# Patient Record
Sex: FEMALE | Race: BLACK OR AFRICAN AMERICAN | Employment: UNEMPLOYED
[De-identification: names, ages, dates, MRNs, and addresses within clinical notes are randomized per-mention and may not be internally consistent; named-entity substitution may affect disease eponyms.]

---

## 2019-02-15 ENCOUNTER — HEALTH MAINTENANCE LETTER (OUTPATIENT)
Age: 24
End: 2019-02-15

## 2019-06-30 ENCOUNTER — HOSPITAL ENCOUNTER (EMERGENCY)
Facility: CLINIC | Age: 24
Discharge: HOME | End: 2019-06-30
Attending: EMERGENCY MEDICINE | Admitting: EMERGENCY MEDICINE

## 2019-06-30 VITALS
HEART RATE: 116 BPM | RESPIRATION RATE: 16 BRPM | DIASTOLIC BLOOD PRESSURE: 75 MMHG | SYSTOLIC BLOOD PRESSURE: 131 MMHG | WEIGHT: 150 LBS | OXYGEN SATURATION: 100 %

## 2019-06-30 DIAGNOSIS — G24.02 DYSTONIC DRUG REACTION: ICD-10-CM

## 2019-06-30 LAB
ANION GAP SERPL CALCULATED.3IONS-SCNC: 9 MMOL/L (ref 3–14)
BUN SERPL-MCNC: 12 MG/DL (ref 7–30)
CA-I SERPL ISE-MCNC: 4.6 MG/DL (ref 4.4–5.2)
CALCIUM SERPL-MCNC: 8.9 MG/DL (ref 8.5–10.1)
CHLORIDE SERPL-SCNC: 105 MMOL/L (ref 94–109)
CO2 SERPL-SCNC: 23 MMOL/L (ref 20–32)
CREAT SERPL-MCNC: 0.62 MG/DL (ref 0.52–1.04)
GFR SERPL CREATININE-BSD FRML MDRD: >90 ML/MIN/{1.73_M2}
GLUCOSE SERPL-MCNC: 143 MG/DL (ref 70–99)
POTASSIUM SERPL-SCNC: 3.6 MMOL/L (ref 3.4–5.3)
SODIUM SERPL-SCNC: 137 MMOL/L (ref 133–144)

## 2019-06-30 PROCEDURE — 80048 BASIC METABOLIC PNL TOTAL CA: CPT | Performed by: EMERGENCY MEDICINE

## 2019-06-30 PROCEDURE — 96374 THER/PROPH/DIAG INJ IV PUSH: CPT

## 2019-06-30 PROCEDURE — 25000128 H RX IP 250 OP 636: Performed by: EMERGENCY MEDICINE

## 2019-06-30 PROCEDURE — 99284 EMERGENCY DEPT VISIT MOD MDM: CPT | Mod: 25

## 2019-06-30 PROCEDURE — 82330 ASSAY OF CALCIUM: CPT | Performed by: EMERGENCY MEDICINE

## 2019-06-30 RX ORDER — BENZTROPINE MESYLATE 2 MG/1
2 TABLET ORAL 2 TIMES DAILY PRN
Qty: 8 TABLET | Refills: 0 | Status: SHIPPED | OUTPATIENT
Start: 2019-06-30

## 2019-06-30 RX ORDER — DIPHENHYDRAMINE HYDROCHLORIDE 50 MG/ML
25 INJECTION INTRAMUSCULAR; INTRAVENOUS ONCE
Status: COMPLETED | OUTPATIENT
Start: 2019-06-30 | End: 2019-06-30

## 2019-06-30 RX ORDER — DIPHENHYDRAMINE HCL 25 MG
25-50 TABLET ORAL EVERY 6 HOURS PRN
Qty: 20 TABLET | Refills: 0 | Status: SHIPPED | OUTPATIENT
Start: 2019-06-30

## 2019-06-30 RX ADMIN — DIPHENHYDRAMINE HYDROCHLORIDE 25 MG: 50 INJECTION, SOLUTION INTRAMUSCULAR; INTRAVENOUS at 22:10

## 2019-06-30 ASSESSMENT — ENCOUNTER SYMPTOMS
SHORTNESS OF BREATH: 0
TROUBLE SWALLOWING: 1

## 2019-06-30 NOTE — ED AVS SNAPSHOT
[facility] Emergency Department  [address]  [facility] [address]  Phone:  [phone]  Fax:  [phone]                                    Niki Cruz   MRN: [id number]    Department:  [facility] Emergency Department   Date of Visit:  6/30/2019           After Visit Summary Signature Page    I have received my discharge instructions, and my questions have been answered. I have discussed any challenges I see with this plan with the nurse or doctor.    ..........................................................................................................................................  Patient/Patient Representative Signature      ..........................................................................................................................................  Patient Representative Print Name and Relationship to Patient    ..................................................               ................................................  Date                                   Time    ..........................................................................................................................................  Reviewed by Signature/Title    ...................................................              ..............................................  Date                                               Time          22EPIC Rev 08/18

## 2019-06-30 NOTE — LETTER
July 2, 2019      To Whom It May Concern:      Niki Cruz was seen in our Emergency Department on 06/30/2019. Her condition is expected to improve over 1-2 days, she may return to work when improved.    Sincerely,        Isi Nieto RN

## 2019-07-01 NOTE — DISCHARGE INSTRUCTIONS
Please return to the emergency department as needed for new or worsening symptoms including recurrence of initial symptoms without resolution using the prescribed medications, seizures, any other concerning symptoms.

## 2019-07-01 NOTE — ED TRIAGE NOTES
Pt reports that she went to [facility] yesterday for suicidal thoughts.  She was there about 24 hrs and released.  Pt denies needing mental health evaluation today.

## 2019-07-01 NOTE — ED PROVIDER NOTES
"  History     Chief Complaint:  Allergic Reaction    HPI   Niki Cruz is a 24 year old female, with diagnosis of anxiety and depression, who presents with her roommate for evaluation of a possible allergic reaction. She states that she was at [facility] earlier today as she was suicidal at that time and received a \"shot of something\". She states that for the last two hours, she has had spasms in her right leg and in her tongue where she is unable to keep her tongue in her mouth. Due to this, patient presented.     Here in the ED, she states no known trigger and endorses some difficulty swallowing, though denies any shortness of breath or ingestion of any over the counter medications. She denies any suicidal ideation here.     Allergies:  Ciprofloxacin     Medications:    Ibuprofen  Naproxen  Nuvaring  Prozac  Hydroxyzine    Past Medical History:    Anxiety   Depressive disorder  Suicidal ideation    Past Surgical History:    History reviewed. No pertinent past surgical history.     Family History:    Father - mental illness  Mother - mental illness, thyroid disease  Sister - mental illness    Social History:  The patient was accompanied to the ED by roommate.  Smoking Status: Yes - occasional  Smokeless Tobacco: N/A  Alcohol Use: Yes - occasional   Drug Use: No      Review of Systems   HENT: Positive for trouble swallowing.         \"tongue spasms\"   Respiratory: Negative for shortness of breath.    Musculoskeletal:        \"Right leg spasm\"   Psychiatric/Behavioral: Negative for suicidal ideas.   All other systems reviewed and are negative.      Physical Exam   Vitals:  Patient Vitals for the past 24 hrs:   BP Pulse Resp SpO2 Weight   06/30/19 2200 131/75 116 16 100 % 68 kg (150 lb)      Physical Exam  Constitutional: Well developed, nontox appearance  Head: Atraumatic.   Mouth/Throat: Oropharynx is clear and moist. Sticking tongue out of mouth, difficulty speaking  Neck:  no stridor  Eyes: no scleral " icterus  Cardiovascular: RRR, 2+ bilat radial pulses  Pulmonary/Chest: nml resp effort, Clear BS bilat  Abdominal: ND, +BS, soft, NT, no rebound or guarding   Ext: Warm, well perfused, no edema  Neurological: A&O, symmetric facies, moves ext x4, 5/5 strength throughout upper and lower extremities, symmetric; moving left lower extremiity  Skin: Skin is warm and dry.   Psychiatric: Anxious, behavior is normal. Thought content normal.   Nursing note and vitals reviewed.      Emergency Department Course   Laboratory:  Laboratory findings were communicated with the patient and roommate who voiced understanding of the findings.  Calcium ionized: 4.6  BMP: Glucose 143 (H) o/w WNL (Creatinine 0.62)     Interventions:   Benadryl 25 mg IV     Emergency Department Course:  Nursing notes and vitals reviewed.  IV was inserted and blood was drawn for laboratory testing, results above.     ()   I performed an exam of the patient as documented above. History obtained from patient.    ()   Updated that patient feels improved after Benadryl.    ()   Rechecked patient. She reports improvement. Discussed plan of care and patient is comfortable with discharge.     I discussed the treatment plan with the patient. They expressed understanding of this plan and consented to discharge. They will be discharged home with instructions for care and follow up. In addition, the patient will return to the emergency department if their symptoms persist, worsen, if new symptoms arise or if there is any concern.  All questions were answered prior to discharge.    Impression & Plan      Medical Decision Makin y.o. M presenting w/ abnml movements    Unable to find out medication that was given earlier this morning.  Patient reports she is no longer suicidal.  Patient's presentation consistent with dystonic reaction.  She is given Benadryl with resolution of her symptoms therefore Cogentin not given.  Doubt seizure, electrolyte  abnormality, intracranial hemorrhage or CVA.  Given resolution of symptoms, at this time I feel patient is safe for discharge.  Prescriptions written as noted below for management of symptoms should they recur and instructions given regarding use.  Patient counseled on results, diagnosis and disposition prior to discharge.  She is understanding and agreeable to plan.  Patient was subsequently discharged in stable condition.    Diagnosis:    ICD-10-CM    1. Dystonic drug reaction G24.02         Disposition:   Discharged.    Discharge Medications:     Medication List      Started    benztropine 2 MG tablet  Commonly known as:  COGENTIN  2 mg, Oral, 2 TIMES DAILY PRN     diphenhydrAMINE 25 MG tablet  Commonly known as:  BENADRYL  25-50 mg, Oral, EVERY 6 HOURS PRN            Scribe Disclosure:  I, Shelly Lee, am serving as a scribe at 10:03 PM on 6/30/2019 to document services personally performed by Mane Robertson MD, based on my observations and the provider's statements to me.  6/30/2019   [facility] EMERGENCY DEPARTMENT       Mane Robertson MD  07/01/19 0070

## 2019-07-01 NOTE — ED TRIAGE NOTES
Pt says that she has had tounge swelling and muscle spasms for the past 2 hours.  Says she has a cipro allergy but she has not taken any.  Pt presents with her tounge hanging out and she says it is spasming.  Denies difficulty breathing.

## 2019-08-26 ENCOUNTER — NURSE TRIAGE (OUTPATIENT)
Dept: NURSING | Facility: CLINIC | Age: 24
End: 2019-08-26

## 2019-08-26 NOTE — TELEPHONE ENCOUNTER
"\"For the last two weeks I've been constipated\".  Caller is reporting rectal pain, she has passed watery stool and also had an accident yesterday.  She sees blood when she wipes.    Advised to go to ER.    Reason for Disposition    [1] Rectal pain or fullness from fecal impaction (rectum full of stool) AND [2] NOT better after SITZ bath, suppository or enema    Additional Information    Negative: [1] Abdomen pain is main symptom AND [2] adult male    Negative: [1] Abdomen pain is main symptom AND [2] adult female    Negative: Rectal bleeding or blood in stool is main symptom    Negative: Rectal pain or itching is main symptom    Negative: Constipation in a cancer patient who is currently (or recently) receiving chemotherapy or radiation therapy, or cancer patient who has metastatic or end-stage cancer and is receiving palliative care    Negative: Patient sounds very sick or weak to the triager    Negative: [1] Vomiting AND [2] abdomen looks much more swollen than usual    Negative: [1] Vomiting AND [2] contains bile (green color)    Negative: [1] Constant abdominal pain AND [2] present > 2 hours    Protocols used: CONSTIPATION-A-    Helen Gorman, ALEJANDRO  [facility]      "

## 2019-08-27 ENCOUNTER — HOSPITAL ENCOUNTER (EMERGENCY)
Facility: CLINIC | Age: 24
Discharge: HOME | End: 2019-08-27
Attending: EMERGENCY MEDICINE | Admitting: EMERGENCY MEDICINE
Payer: COMMERCIAL

## 2019-08-27 VITALS
TEMPERATURE: 98.5 F | WEIGHT: 149.91 LBS | SYSTOLIC BLOOD PRESSURE: 126 MMHG | OXYGEN SATURATION: 99 % | DIASTOLIC BLOOD PRESSURE: 83 MMHG | RESPIRATION RATE: 16 BRPM | HEART RATE: 80 BPM

## 2019-08-27 DIAGNOSIS — A60.1 HERPES SIMPLEX INFECTION OF PERIANAL SKIN: ICD-10-CM

## 2019-08-27 LAB
HCG UR QL: NEGATIVE
SPECIMEN SOURCE: NORMAL
WET PREP SPEC: NORMAL

## 2019-08-27 PROCEDURE — 87591 N.GONORRHOEAE DNA AMP PROB: CPT | Performed by: EMERGENCY MEDICINE

## 2019-08-27 PROCEDURE — 87529 HSV DNA AMP PROBE: CPT | Performed by: EMERGENCY MEDICINE

## 2019-08-27 PROCEDURE — 25000125 ZZHC RX 250: Performed by: EMERGENCY MEDICINE

## 2019-08-27 PROCEDURE — 87210 SMEAR WET MOUNT SALINE/INK: CPT | Performed by: EMERGENCY MEDICINE

## 2019-08-27 PROCEDURE — 25000132 ZZH RX MED GY IP 250 OP 250 PS 637: Performed by: EMERGENCY MEDICINE

## 2019-08-27 PROCEDURE — 87491 CHLMYD TRACH DNA AMP PROBE: CPT | Performed by: EMERGENCY MEDICINE

## 2019-08-27 PROCEDURE — 81025 URINE PREGNANCY TEST: CPT | Performed by: EMERGENCY MEDICINE

## 2019-08-27 PROCEDURE — 25000128 H RX IP 250 OP 636: Performed by: EMERGENCY MEDICINE

## 2019-08-27 PROCEDURE — 99284 EMERGENCY DEPT VISIT MOD MDM: CPT | Mod: 25

## 2019-08-27 PROCEDURE — 96372 THER/PROPH/DIAG INJ SC/IM: CPT

## 2019-08-27 RX ORDER — AZITHROMYCIN 250 MG/1
1000 TABLET, FILM COATED ORAL ONCE
Status: COMPLETED | OUTPATIENT
Start: 2019-08-27 | End: 2019-08-27

## 2019-08-27 RX ORDER — ACYCLOVIR 400 MG/1
400 TABLET ORAL 3 TIMES DAILY
Qty: 30 TABLET | Refills: 0 | Status: SHIPPED | OUTPATIENT
Start: 2019-08-27 | End: 2019-09-06

## 2019-08-27 RX ORDER — ACYCLOVIR 200 MG/1
400 CAPSULE ORAL ONCE
Status: COMPLETED | OUTPATIENT
Start: 2019-08-27 | End: 2019-08-27

## 2019-08-27 RX ORDER — POLYETHYLENE GLYCOL 3350 17 G/17G
1 POWDER, FOR SOLUTION ORAL DAILY
Qty: 527 G | Refills: 0 | Status: SHIPPED | OUTPATIENT
Start: 2019-08-27 | End: 2019-09-26

## 2019-08-27 RX ADMIN — ACYCLOVIR 400 MG: 200 CAPSULE ORAL at 15:58

## 2019-08-27 RX ADMIN — LIDOCAINE HYDROCHLORIDE 250 MG: 10 INJECTION, SOLUTION EPIDURAL; INFILTRATION; INTRACAUDAL; PERINEURAL at 15:59

## 2019-08-27 RX ADMIN — AZITHROMYCIN MONOHYDRATE 1000 MG: 250 TABLET ORAL at 15:31

## 2019-08-27 ASSESSMENT — ENCOUNTER SYMPTOMS
NAUSEA: 1
VOMITING: 0
CONSTIPATION: 1
ABDOMINAL PAIN: 1
HEMATURIA: 0
FREQUENCY: 0
DYSURIA: 1
SHORTNESS OF BREATH: 0

## 2019-08-27 NOTE — ED TRIAGE NOTES
Rash noted to right buttock and is having hard time having a bowel movement. Yesterday had a small BM with blood noted with wiping. States she usually has regular BM daily.

## 2019-08-27 NOTE — ED AVS SNAPSHOT
[facility] Emergency Department  [address]  [facility] [address]  Phone:  [phone]  Fax:  [phone]                                    Niki Cruz   MRN: [id number]    Department:  [facility] Emergency Department   Date of Visit:  8/27/2019           After Visit Summary Signature Page    I have received my discharge instructions, and my questions have been answered. I have discussed any challenges I see with this plan with the nurse or doctor.    ..........................................................................................................................................  Patient/Patient Representative Signature      ..........................................................................................................................................  Patient Representative Print Name and Relationship to Patient    ..................................................               ................................................  Date                                   Time    ..........................................................................................................................................  Reviewed by Signature/Title    ...................................................              ..............................................  Date                                               Time          22EPIC Rev 08/18

## 2019-08-27 NOTE — ED PROVIDER NOTES
"  History   Chief Complaint:  Constipation    HPI   Niki Cruz is a 24 year old female who presents with constipation and a rash on her buttocks and groin. The patient reports constipation for the last 2.5 weeks causing discomfort, so she came to the emergency department for further evaluation. She states that when she tries to have a bowel movement, rectal pain is 10/10; it is sometimes liquid-like and other times it comes out in \"chunks,\" though it is not hard. She notes that she has tried Colace, none today, but still to no relief. The patient also notes a quarter sized rash on the inner side of her right buttock near her rectal area, which is bleeding, with radiation to her vaginal area. It also feels like her entire bottom feels \"swollen\" secondary to her constipation, with radiation of sensitivity down her bilateral legs into her feet. The patient denies ever having this issue to this extent before, though has had constipation in the past, though never lasting more than one week and resolving with stool softeners. She notes that it stings when she urinates, but attributes this to the rash. She denies any other associated urinary symptoms, as well any other rash elsewhere, fever, shortness of breath, chest pain, vomiting, or family history of bowel disease, but endorses nausea and some abdominal pain. Patient notes that her periods are normal, last one about a month ago, 7/25. No current vaginal bleeding. She reports she is sexually active with one partner. She has never had a rash or pain like this before. No melena or BRBPR.     Allergies:  Ciprofloxacin     Medications:    Nuvaring  Prozac  Hydroxyzine    Past Medical History:    Anxiety  Depression  Suicidal ideation    Past Surgical History:    History reviewed. No pertinent surgical history.    Family History:    Mental illness  Thyroid disease    Social History:  Smoking status: Occasional smoker  Alcohol use: Yes  Drug use: No  Presents to the " ED with herself  Marital Status:  Single     Review of Systems   Respiratory: Negative for shortness of breath.    Cardiovascular: Negative for chest pain.   Gastrointestinal: Positive for abdominal pain, constipation and nausea. Negative for vomiting.   Genitourinary: Positive for dysuria (stings). Negative for frequency, hematuria and urgency.   All other systems reviewed and are negative.      Physical Exam     Patient Vitals for the past 24 hrs:   BP Temp Temp src Pulse Resp SpO2 Weight   08/27/19 1208 126/85 98.5  F (36.9  C) Temporal 102 16 98 % 68 kg (149 lb 14.6 oz)       Physical Exam  General: Well appearing, nontoxic. Resting comfortably  Head:  Scalp, face, and head appear normal  Eyes:  Pupils are equal, round    Conjunctivae non-injected and sclerae white  ENT:    The external nose is normal    Pinnae are normal  Neck:  Normal range of motion    There is no rigidity noted    Trachea is in the midline  CV:  Regular rate and rhythm     Normal S1/S2, no S3/S4    No murmur or rub  Resp:  Lungs are clear and equal bilaterally    There is no tachypnea    No increased work of breathing    No rales, wheezing, or rhonchi  GI:  Abdomen is soft, no rigidity or guarding    No distension, or mass    No tenderness or rebound tenderness  :  Normal external female genitalia.  There is an area of scattered small skin ulcerations to the right inferior external labia, no definite vesicles.  This area is tender to palpation.  No fluctuance or surrounding erythema or induration.  Vaginal mucosa is hyperemic.  There is significant thick white vaginal discharge.  Cervix is erythematous and tender.  No bleeding. No cervical lesions. No cervical motion tenderness, adnexal tenderness, or adnexal masses.   Rectal Exam:  No external anal lesions. Rectal tone normal. No bright red blood or melena. No internal masses or lesions palpated.  No significant stool palpable in the rectal vault.  MS:  Normal muscular tone    Symmetric  motor strength    No lower extremity edema  Skin:  Right mid medial buttock/gluteal fold 3 to 4 cm lateral to the anus with cluster of tan appearing vesicles and small areas of scant epidermal ulceration. No fluctuance.  No surrounding erythema or induration.  Lesions are tender to palpation.  Neuro:  Awake and alert    Speech is normal and fluent    Moves all extremities spontaneously  Psych: Normal affect.  Appropriate interactions.      Emergency Department Course   Laboratory:   HCG Qualitative Urine: Negative    Chlamydia trachomatis PCR: In process  Neisseria gonorrhoea PCR: In process  Wet prep: Vaginal specimen, no trichomonas seen, no yeast seen, moderate PMNs seen, no clue cells seen  HSV 1 and 2 DNA by PCR: In process    Emergency Department Course:  Past medical records, nursing notes, and vitals reviewed.  1238: I performed an exam of the patient and obtained history, as documented above.    1253: I performed a pelvic exam on the patient, chaperoned by female RN.    1520: I rechecked the patient. Explained findings to patient.    1524: I rechecked the patient. Findings and plan explained to the patient. Patient discharged home with instructions regarding supportive care, medications, and reasons to return. The importance of close follow-up was reviewed.     Impression & Plan    Medical Decision Making:  Niki Cruz is a 24 year old female who presents for evaluation of clinical constipation as well as a new rash on her right buttock as well as the right aspect of her labia which is painful and intermittently bleeding.  She denies any melena or bright red blood per rectum.  No abdominal pain.  On my evaluation she is well-appearing afebrile and nontoxic.  Her abdominal exam is completely benign without evidence of peritonitis or acute surgical emergency.  Clinical signs and symptoms are not consistent with bowel obstruction, volvulus or other acute surgical pathology of the abdomen.  On  examination patient has cluster of vesicles and skin ulceration to the right medial buttock not involving the anal mucosa.  No verrucous lesions.  No hemorrhoids or rectal fissure.  No blood seen on rectal exam.  Pelvic exam was also performed which reveals small area of skin ulceration and tenderness to the right inferior external labia.  No obvious vesicles at this area.  In addition significant thick white vaginal discharge and vaginal mucosal and cervix erythema is noted.  This is concerning for cervicitis.  Wet prep was negative for yeast, BV, trichomonas.  GC and Chlamydia testing was sent and is pending.  Patient was treated empirically with ceftriaxone and azithromycin.  The ulcerated skin lesions were swabbed to test for HSV.  Ultimately I feel that her skin lesion is consistent with genital and perianal HSV infection.  Patient will be started on acyclovir 400 mg 3 times daily.  I have recommended very close follow-up with primary care and/or OB/GYN to ensure resolution of her symptoms.  At this time there is no evidence for bacterial soft tissue infection, superimposed bacterial infection, systemic infection or PID.  I recommended abstaining from any sexual activity until 2 weeks after all of her lesions are completely healed and gone.  Close return precautions were provided the patient was agreeable to plan of care and she was discharged in stable condition.    Critical Care time:  none    Diagnosis:    ICD-10-CM   1. Herpes simplex infection of perianal skin A60.1       Disposition: Diischarged to home    Discharge Medications:  New Prescriptions    ACYCLOVIR (ZOVIRAX) 400 MG TABLET    Take 1 tablet (400 mg) by mouth 3 times daily for 10 days    LIDOCAINE 2 % GEL    Externally apply 1 Application topically 3 times daily as needed (for skin irritation/pain)    POLYETHYLENE GLYCOL (MIRALAX) POWDER    Take 17 g (1 capful) by mouth daily For constipation.     IStephanie, am serving as a scribe at  12:38 PM on 8/27/2019 to document services personally performed by Jono La MD based on my observations and the provider's statements to me.     Stephanie Morel  8/27/2019   [facility] EMERGENCY DEPARTMENT       Jono La MD  08/28/19 1052

## 2019-08-27 NOTE — DISCHARGE INSTRUCTIONS
You were treated empirically for gonorrhea and chlamydia. The final test results will be available in 24-48 hours. Refrain from any sexual activity for 2 weeks after your rash/sores are completely healed. Herpes is contagious and can be spread. Any sexual partners should be tested and treated for any signs of infection.

## 2019-08-28 ENCOUNTER — TELEPHONE (OUTPATIENT)
Dept: EMERGENCY MEDICINE | Facility: CLINIC | Age: 24
End: 2019-08-28

## 2019-08-28 LAB
C TRACH DNA SPEC QL NAA+PROBE: NEGATIVE
HSV1 DNA SPEC QL NAA+PROBE: NEGATIVE
HSV2 DNA SPEC QL NAA+PROBE: POSITIVE
N GONORRHOEA DNA SPEC QL NAA+PROBE: NEGATIVE
SPECIMEN SOURCE: ABNORMAL
SPECIMEN SOURCE: NORMAL
SPECIMEN SOURCE: NORMAL

## 2019-08-28 NOTE — TELEPHONE ENCOUNTER
"[facility] Emergency Department Lab result notification:    [place] ED lab result protocol used  Herpes Simplex protocol    Reason for call  Notify of lab results, assess symptoms,  review ED providers recommendations/discharge instructions (if necessary) and advise per ED lab result f/u protocol    Lab Result   Final result for HSV 1 is [NEGATIVE] and HSV 2 DNA by PCR is [POSITIVE].    [facility] ED discharge antiviral medication (if prescribed): Acyclovir (ZOVIRAX) 400 MG tablet, Take 1 tablet (400 mg) by mouth 3 times daily for 10 days  If treated in the [facility] ED, Notify patient of result.  Information table from ED Provider visit on 8/27/19  Symptoms reported at ED visit (Chief complaint, HPI) Constipation     HPI   Niki Cruz is a 24 year old female who presents with constipation and a rash on her buttocks and groin. The patient reports constipation for the last 2.5 weeks causing discomfort, so she came to the emergency department for further evaluation. She states that when she tries to have a bowel movement, rectal pain is 10/10; it is sometimes liquid-like and other times it comes out in \"chunks,\" though it is not hard. She notes that she has tried Colace, none today, but still to no relief. The patient also notes a quarter sized rash on the inner side of her right buttock near her rectal area, which is bleeding, with radiation to her vaginal area. It also feels like her entire bottom feels \"swollen\" secondary to her constipation, with radiation of sensitivity down her bilateral legs into her feet. The patient denies ever having this issue to this extent before, though has had constipation in the past, though never lasting more than one week and resolving with stool softeners. She notes that it stings when she urinates, but attributes this to the rash. She denies any other associated urinary symptoms, as well any other rash elsewhere, fever, shortness of breath, chest pain, vomiting, " or family history of bowel disease, but endorses nausea and some abdominal pain. Patient notes that her periods are normal, last one about a month ago, 7/25. No current vaginal bleeding. She reports she is sexually active with one partner. She has never had a rash or pain like this before. No melena or BRBPR.    ED providers Impression and Plan (applicable information) Niki Cruz is a 24 year old female who presents for evaluation of clinical constipation as well as a new rash on her right buttock as well as the right aspect of her labia which is painful and intermittently bleeding.  She denies any melena or bright red blood per rectum.  No abdominal pain.  On my evaluation she is well-appearing afebrile and nontoxic.  Her abdominal exam is completely benign without evidence of peritonitis or acute surgical emergency.  Clinical signs and symptoms are not consistent with bowel obstruction, volvulus or other acute surgical pathology of the abdomen.  On examination patient has cluster of vesicles and skin ulceration to the right medial buttock not involving the anal mucosa.  No verrucous lesions.  No hemorrhoids or rectal fissure.  No blood seen on rectal exam.  Pelvic exam was also performed which reveals small area of skin ulceration and tenderness to the right inferior external labia.  No obvious vesicles at this area.  In addition significant thick white vaginal discharge and vaginal mucosal and cervix erythema is noted.  This is concerning for cervicitis.  Wet prep was negative for yeast, BV, trichomonas.  GC and Chlamydia testing was sent and is pending.  Patient was treated empirically with ceftriaxone and azithromycin.  The ulcerated skin lesions were swabbed to test for HSV.  Ultimately I feel that her skin lesion is consistent with genital and perianal HSV infection.  Patient will be started on acyclovir 400 mg 3 times daily.  I have recommended very close follow-up with primary care and/or OB/GYN to  ensure resolution of her symptoms.  At this time there is no evidence for bacterial soft tissue infection, superimposed bacterial infection, systemic infection or PID.  I recommended abstaining from any sexual activity until 2 weeks after all of her lesions are completely healed and gone.  Close return precautions were provided the patient was agreeable to plan of care and she was discharged in stable condition.   Miscellaneous information NA     RN Assessment (Patient s current Symptoms), include time called.  [Insert Left message here if message left]  3;45PM: Spoke with Patient. She states that she is feeling alittle better, is just now going to  the acyclovir.   RN Recommendations/Instructions per [facility] ED lab result protocol  Patient notified of lab result and treatment recommendation.   Reviewed with Patient the difference between herpes 1 and 2 as she had questions regarding this.  Patient states that she got written information regarding herpes with her AVS from the ED and she has no further questions about it at this time.  Advised to follow up with PCP as directed by the ED provider. Patient does not have a PCP, she was transferred to scheduling to make a clinic appointment this week.  Advised to  the Acyclovir and finish all the medication.   Patient is comfortable with the information given and has no further questions.     Please Contact your PCP clinic or return to the Emergency department if your:    Symptoms worsen or other concerning symptom's.    PCP follow-up Questions asked: YES       [RN Name]  Anita Burch RN  [facility] RN  Lung Nodule and ED Lab Result RN  Epic pool (ED late result f/u RN): [id number]  [facility] INCIDENTAL RADIOLOGY F/U NURSES: P [id number]  # [phone]      Copy of Lab result   HSV 1 and 2 DNA by PCR [[id number]] (Order [id number])   Order Requisition     HSV 1 and 2 DNA by PCR (Order #[id number]) on 8/27/19   Exam Information     Exam Date Exam Time  Accession # Results    8/27/19  1:13 PM     Component Value Flag Ref Range Units Status Collected Lab   HSV Specimen Type Skin     Final 08/27/2019  1:13 PM [facility]   HSV Type 1 PCR Negative   NEG^Negative  Final 08/27/2019  1:13    HSV Type 2 PCR Positive  Abnormal   NEG^Negative  Final 08/27/2019  1:13    Comment:     The qualitative Herpes simplex virus DNA assay is a real-time polymerase   chain reaction (PCR) utilizing analyte specific reagents manufactured by [employer].  Analyte Specific Reagents (ASRs) are used in many laboratory   tests necessary for standard medical care and generally do not require FDA   approval.     This test was developed and its performance characteristics determined by   the [facility].  It has not been   cleared or approved by the US Food and Drug Administration.

## 2019-08-31 PROBLEM — B00.9 HSV-2 INFECTION: Status: ACTIVE | Noted: 2019-08-31

## 2019-12-22 ENCOUNTER — OFFICE VISIT (OUTPATIENT)
Dept: URGENT CARE | Facility: URGENT CARE | Age: 24
End: 2019-12-22
Payer: COMMERCIAL

## 2019-12-22 VITALS
HEART RATE: 92 BPM | RESPIRATION RATE: 20 BRPM | TEMPERATURE: 98.5 F | SYSTOLIC BLOOD PRESSURE: 102 MMHG | DIASTOLIC BLOOD PRESSURE: 66 MMHG | WEIGHT: 140 LBS

## 2019-12-22 DIAGNOSIS — J03.90 TONSILLITIS: Primary | ICD-10-CM

## 2019-12-22 DIAGNOSIS — R07.0 THROAT PAIN: ICD-10-CM

## 2019-12-22 LAB
DEPRECATED S PYO AG THROAT QL EIA: NORMAL
SPECIMEN SOURCE: NORMAL

## 2019-12-22 PROCEDURE — 87880 STREP A ASSAY W/OPTIC: CPT | Performed by: FAMILY MEDICINE

## 2019-12-22 PROCEDURE — 87081 CULTURE SCREEN ONLY: CPT | Performed by: FAMILY MEDICINE

## 2019-12-22 PROCEDURE — 99202 OFFICE O/P NEW SF 15 MIN: CPT | Performed by: FAMILY MEDICINE

## 2019-12-22 RX ORDER — CEFDINIR 300 MG/1
300 CAPSULE ORAL 2 TIMES DAILY
Qty: 20 CAPSULE | Refills: 0 | Status: SHIPPED | OUTPATIENT
Start: 2019-12-22 | End: 2020-01-01

## 2019-12-22 NOTE — PROGRESS NOTES
SUBJECTIVE:Niki Cruz is a 24 year old female with a chief complaint of sore throat.    Onset of symptoms was day(s) ago.    Course of illness: still present.    Severity moderate  Current and Associated symptoms: fever  Treatment measures tried include Tylenol/Ibuprofen.  Predisposing factors include None.    Past Medical History:   Diagnosis Date     HSV-2 infection 8/31/2019     Allergies   Allergen Reactions     Ciprofloxacin      Social History     Tobacco Use     Smoking status: Not on file   Substance Use Topics     Alcohol use: Not on file       ROS:  SKIN: no rash  GI: no vomiting    OBJECTIVE:   /66 (Cuff Size: Adult Regular)   Pulse 92   Temp 98.5  F (36.9  C) (Oral)   Resp 20   Wt 63.5 kg (140 lb) GENERAL APPEARANCE: healthy, alert and no distress  EYES: EOMI,  PERRL, conjunctiva clear  HENT: ear canals and TM's normal.  Nose normal.  Pharynx erythematous with some exudate noted.  NECK: supple, non-tender to palpation, no adenopathy noted  RESP: lungs clear to auscultation - no rales, rhonchi or wheezes  SKIN: no suspicious lesions or rashes    Rapid Strep test is negative; await throat culture results.      ICD-10-CM    1. Tonsillitis J03.90 cefdinir (OMNICEF) 300 MG capsule   2. Throat pain R07.0 RAPID STREP SCREEN     Beta strep group A culture       Symptomatic treat with gargles, lozenges, and OTC analgesic as needed.  Follow-up with primary clinic if not improving.

## 2019-12-23 LAB
BACTERIA SPEC CULT: NORMAL
SPECIMEN SOURCE: NORMAL

## 2020-03-11 ENCOUNTER — HEALTH MAINTENANCE LETTER (OUTPATIENT)
Age: 25
End: 2020-03-11

## 2021-01-03 ENCOUNTER — HEALTH MAINTENANCE LETTER (OUTPATIENT)
Age: 26
End: 2021-01-03

## 2021-04-25 ENCOUNTER — HEALTH MAINTENANCE LETTER (OUTPATIENT)
Age: 26
End: 2021-04-25

## 2021-10-10 ENCOUNTER — HEALTH MAINTENANCE LETTER (OUTPATIENT)
Age: 26
End: 2021-10-10

## 2022-05-21 ENCOUNTER — HEALTH MAINTENANCE LETTER (OUTPATIENT)
Age: 27
End: 2022-05-21

## 2022-09-18 ENCOUNTER — HEALTH MAINTENANCE LETTER (OUTPATIENT)
Age: 27
End: 2022-09-18

## 2023-06-04 ENCOUNTER — HEALTH MAINTENANCE LETTER (OUTPATIENT)
Age: 28
End: 2023-06-04

## 2025-05-12 ENCOUNTER — RESULTS FOLLOW-UP (OUTPATIENT)
Dept: URGENT CARE | Facility: URGENT CARE | Age: 30
End: 2025-05-12

## 2025-05-12 ENCOUNTER — OFFICE VISIT (OUTPATIENT)
Dept: URGENT CARE | Facility: URGENT CARE | Age: 30
End: 2025-05-12
Payer: COMMERCIAL

## 2025-05-12 VITALS
HEART RATE: 87 BPM | OXYGEN SATURATION: 100 % | DIASTOLIC BLOOD PRESSURE: 78 MMHG | WEIGHT: 149.6 LBS | SYSTOLIC BLOOD PRESSURE: 115 MMHG | TEMPERATURE: 97.9 F | RESPIRATION RATE: 18 BRPM | BODY MASS INDEX: 24.93 KG/M2 | HEIGHT: 65 IN

## 2025-05-12 DIAGNOSIS — N89.8 VAGINAL DISCHARGE: ICD-10-CM

## 2025-05-12 DIAGNOSIS — Z11.3 SCREEN FOR STD (SEXUALLY TRANSMITTED DISEASE): Primary | ICD-10-CM

## 2025-05-12 DIAGNOSIS — R30.0 DYSURIA: ICD-10-CM

## 2025-05-12 LAB
ALBUMIN UR-MCNC: 100 MG/DL
APPEARANCE UR: CLEAR
BACTERIA #/AREA URNS HPF: ABNORMAL /HPF
BILIRUB UR QL STRIP: ABNORMAL
CLUE CELLS: PRESENT
COLOR UR AUTO: YELLOW
GLUCOSE UR STRIP-MCNC: NEGATIVE MG/DL
HCG UR QL: NEGATIVE
HGB UR QL STRIP: ABNORMAL
KETONES UR STRIP-MCNC: 40 MG/DL
LEUKOCYTE ESTERASE UR QL STRIP: ABNORMAL
MUCOUS THREADS #/AREA URNS LPF: PRESENT /LPF
NITRATE UR QL: NEGATIVE
PH UR STRIP: 7 [PH] (ref 5–7)
RBC #/AREA URNS AUTO: ABNORMAL /HPF
SP GR UR STRIP: 1.02 (ref 1–1.03)
SQUAMOUS #/AREA URNS AUTO: ABNORMAL /LPF
TRICHOMONAS, WET PREP: ABNORMAL
UROBILINOGEN UR STRIP-ACNC: 1 E.U./DL
WBC #/AREA URNS AUTO: ABNORMAL /HPF
WBC'S/HIGH POWER FIELD, WET PREP: ABNORMAL
YEAST, WET PREP: ABNORMAL

## 2025-05-12 PROCEDURE — 87491 CHLMYD TRACH DNA AMP PROBE: CPT

## 2025-05-12 PROCEDURE — 81001 URINALYSIS AUTO W/SCOPE: CPT

## 2025-05-12 PROCEDURE — 87591 N.GONORRHOEAE DNA AMP PROB: CPT

## 2025-05-12 PROCEDURE — 81025 URINE PREGNANCY TEST: CPT

## 2025-05-12 PROCEDURE — 87210 SMEAR WET MOUNT SALINE/INK: CPT

## 2025-05-12 PROCEDURE — 87086 URINE CULTURE/COLONY COUNT: CPT

## 2025-05-12 PROCEDURE — 99213 OFFICE O/P EST LOW 20 MIN: CPT

## 2025-05-12 NOTE — PATIENT INSTRUCTIONS
- Would benefit from being seen emergently and proceeding directly to an emergency department  - Recommend evaluation in the Emergency department for pelvic and right lower quadrant abdominal pain  - Would benefit from pelvic exam and likely appendix/ovarian ultrasound and possibly CT scan  - [facility]  [address] [place] [address] , [place] [address]   [address]  [phone]

## 2025-05-12 NOTE — PROGRESS NOTES
Urgent Care Clinic Visit    Chief Complaint   Patient presents with    UTI     Pain when urinating, notice blood when wiping, some abdominal pain, urine odor, brownish discharge - onset 3-4 days, worsening    STD     Would like STD check                5/12/2025     1:48 PM   Additional Questions   Roomed by Fanny Ahuja   Accompanied by self     Pre-Provider Visit Orders- Urinalysis UA/UC  Patient reports the following symptoms:  possible urinary tract infection (UTI) , possible bladder infection , discomfort, pain or burning with urination , frequent urination , foul-smelling urine , and blood in the urine   Does the patient report any of the following symptoms: vaginal discharge, vaginal itching, possible yeast infection, has a urinary catheter in place, or unable to void in a specimen cup?  Patient complains of vaginal discharge   Pre-Provider Visit Orders - Vaginal Infection  Reason for visit:  Vaginal discharge

## 2025-05-12 NOTE — PROGRESS NOTES
Assessment & Plan:      Problem List Items Addressed This Visit    None  Visit Diagnoses         Screen for STD (sexually transmitted disease)    -  Primary    Relevant Orders    HIV Antigen Antibody Combo    Treponema Abs w Reflex to RPR and Titer      Dysuria        Relevant Orders    UA Macroscopic with reflex to Microscopic and Culture - Lab Collect (Completed)    UA Microscopic with Reflex to Culture (Completed)    Urine Culture    HCG qualitative urine (Completed)      Vaginal discharge        Relevant Orders    Wet prep - lab collect (Completed)    Chlamydia trachomatis/Neisseria gonorrhoeae by PCR - Clinic Collect    HCG qualitative urine (Completed)          Medical Decision Making    Vaginal discharge  Dysuria  Right lower quadrant abdominal pain  Patient presenting with vaginal discharge, dysuria, pelvic and right lower quadrant abdominal pain.  During interview and time of exam was going to proceed with pelvic exam to determine if patient had any still cervical motion tenderness or cervical changes.  However, through shared decision making after determining that patient had focal right lower quadrant abdominal pain and tenderness with exam opted to proceed with evaluation in the emergency department where advanced diagnostic imaging, laboratory workup and possible pelvic exam could be performed.  Opted for this pathway so that patient would not be subjected to 2 pelvic exams.  Discussed at length with patient about diagnostic dilemma of inability to safely rule out right lower quadrant abdominal pain in the setting of urgent care.  Patient demonstrated understanding with care plan as per below and will proceed to emergency department for evaluation of her symptoms.  HIV and syphilis evaluation were not collected while here but would be beneficial to thorough workup along with advanced diagnostic imaging.  Patient stable for transport by private car to emergency department.  Patient discharged in stable  condition and all questions answered.  - Would benefit from being seen emergently and proceeding directly to an emergency department  - Recommend evaluation in the Emergency department for pelvic and right lower quadrant abdominal pain  - Would benefit from pelvic exam and likely appendix/ovarian ultrasound and possibly CT scan  - [facility]  [address] [address]   [address]  [phone]      Patient verbalizes understanding of the treatment regimen and will follow up as needed for recheck and evaluation if symptoms worsen or do not improve. Patient states understanding and agreement with the plan.    Nixon Peter MD  Internal Medicine-Pediatrics, PGY-4  [facility]     Subjective:      Niki Cruz is a 30 year old female here for evaluation of dysuria, abdominal pain.    Patient describes increase in pain and urgency of urination in the last 3 to 4 days.  She has had some yellowish to brownish back to yellowish discharge vaginally.  The discharge is somewhat foul-smelling.  She tells me that she is currently only sexually active with her male partner, however she is not certain if he is only sexually active with her.  Last Pap was approximately 1 year and 1 month ago.  She has no past medical history of pelvic inflammatory disease.  She is not currently on her menstrual cycle but would like a evaluation for pregnancy test.  She has had no fevers, weight loss, nausea, vomiting, night sweats.  She is otherwise in her normal state of health.  She would like a pelvic exam today because she is having abdominal pain and pelvic pain that she is not sure if she can determine is separate from her pain with urination.     The following portions of the patient's history were reviewed and updated as appropriate: allergies, current medications, and problem list.     Review of Systems  Pertinent items  "are noted in HPI.  All other systems are negative.    Allergies  Allergies   Allergen Reactions    Ciprofloxacin        No family history on file.    Social History     Tobacco Use    Smoking status: Never     Passive exposure: Never    Smokeless tobacco: Never   Substance Use Topics    Alcohol use: Not on file        Objective:      /78 (BP Location: Left arm, Patient Position: Sitting, Cuff Size: Adult Regular)   Pulse 87   Temp 97.9  F (36.6  C) (Tympanic)   Resp 18   Ht 1.651 m (5' 5\")   Wt 67.9 kg (149 lb 9.6 oz)   LMP  (LMP Unknown)   SpO2 100%   BMI 24.89 kg/m    General appearance - oriented to person, place, and time and in mild to moderate distress  Mental status - normal mood, behavior, speech, dress, motor activity, and thought processes  Mouth - moist mucous membranes  Neck - neck is supple  Chest - clear to auscultation, no wheezes, rales or rhonchi, symmetric air entry  Heart - normal rate, regular rhythm, normal S1, S2, no murmurs, rubs, clicks or gallops  Abdomen - abdomen is soft, nondistended, without organomegaly, tender to palpation in the right lower quadrant and pelvic region  Extremities - no pedal edema noted  Skin - normal coloration and turgor, no rashes, no suspicious skin lesions noted     Lab & Imaging Results    Results for orders placed or performed in visit on 05/12/25 (from the past 24 hours)   UA Macroscopic with reflex to Microscopic and Culture - Lab Collect    Specimen: Urine, Clean Catch   Result Value Ref Range    Color Urine Yellow Colorless, Straw, Light Yellow, Yellow    Appearance Urine Clear Clear    Glucose Urine Negative Negative mg/dL    Bilirubin Urine Small (A) Negative    Ketones Urine 40 (A) Negative mg/dL    Specific Gravity Urine 1.020 1.003 - 1.035    Blood Urine Trace (A) Negative    pH Urine 7.0 5.0 - 7.0    Protein Albumin Urine 100 (A) Negative mg/dL    Urobilinogen Urine 1.0 0.2, 1.0 E.U./dL    Nitrite Urine Negative Negative    Leukocyte " Esterase Urine Trace (A) Negative   Wet prep - lab collect    Specimen: Vagina; Swab   Result Value Ref Range    Trichomonas Absent Absent    Yeast Absent Absent    Clue Cells Present (A) Absent    WBCs/high power field 1+ (A) None   UA Microscopic with Reflex to Culture   Result Value Ref Range    Bacteria Urine Moderate (A) None Seen /HPF    RBC Urine 2-5 (A) 0-2 /HPF /HPF    WBC Urine 10-25 (A) 0-5 /HPF /HPF    Squamous Epithelials Urine Many (A) None Seen /LPF    Mucus Urine Present (A) None Seen /LPF   HCG qualitative urine   Result Value Ref Range    hCG Urine Qualitative Negative Negative     I personally reviewed these results and discussed findings with the patient.

## 2025-05-13 LAB
BACTERIA UR CULT: NORMAL
C TRACH DNA SPEC QL PROBE+SIG AMP: NEGATIVE
N GONORRHOEA DNA SPEC QL NAA+PROBE: NEGATIVE
SPECIMEN TYPE: NORMAL

## 2025-05-17 ENCOUNTER — HEALTH MAINTENANCE LETTER (OUTPATIENT)
Age: 30
End: 2025-05-17

## 2025-07-05 ENCOUNTER — OFFICE VISIT (OUTPATIENT)
Dept: URGENT CARE | Facility: URGENT CARE | Age: 30
End: 2025-07-05
Payer: COMMERCIAL

## 2025-07-05 VITALS
RESPIRATION RATE: 18 BRPM | DIASTOLIC BLOOD PRESSURE: 76 MMHG | SYSTOLIC BLOOD PRESSURE: 118 MMHG | HEIGHT: 65 IN | WEIGHT: 150.2 LBS | OXYGEN SATURATION: 100 % | HEART RATE: 81 BPM | TEMPERATURE: 98.2 F | BODY MASS INDEX: 25.02 KG/M2

## 2025-07-05 DIAGNOSIS — J02.9 SORE THROAT: ICD-10-CM

## 2025-07-05 DIAGNOSIS — J02.0 STREP THROAT: Primary | ICD-10-CM

## 2025-07-05 LAB — DEPRECATED S PYO AG THROAT QL EIA: POSITIVE

## 2025-07-05 PROCEDURE — 99213 OFFICE O/P EST LOW 20 MIN: CPT | Performed by: PHYSICIAN ASSISTANT

## 2025-07-05 PROCEDURE — 87880 STREP A ASSAY W/OPTIC: CPT | Performed by: PHYSICIAN ASSISTANT

## 2025-07-05 RX ORDER — DEXAMETHASONE SODIUM PHOSPHATE 10 MG/ML
10 INJECTION INTRAMUSCULAR; INTRAVENOUS ONCE
Status: COMPLETED | OUTPATIENT
Start: 2025-07-05 | End: 2025-07-05

## 2025-07-05 RX ORDER — PENICILLIN V POTASSIUM 500 MG/1
500 TABLET, FILM COATED ORAL 2 TIMES DAILY
Qty: 20 TABLET | Refills: 0 | Status: SHIPPED | OUTPATIENT
Start: 2025-07-05 | End: 2025-07-15

## 2025-07-05 RX ADMIN — DEXAMETHASONE SODIUM PHOSPHATE 10 MG: 10 INJECTION INTRAMUSCULAR; INTRAVENOUS at 15:20

## 2025-07-05 ASSESSMENT — PAIN SCALES - GENERAL: PAINLEVEL_OUTOF10: MILD PAIN (3)

## 2025-07-05 ASSESSMENT — ENCOUNTER SYMPTOMS
CHILLS: 0
DIARRHEA: 0
WEAKNESS: 0
JOINT SWELLING: 0
LIGHT-HEADEDNESS: 0
BACK PAIN: 0
PALPITATIONS: 0
FEVER: 0
NECK STIFFNESS: 0
VOMITING: 0
SORE THROAT: 1
COUGH: 0
EYES NEGATIVE: 1
MUSCULOSKELETAL NEGATIVE: 1
RESPIRATORY NEGATIVE: 1
CARDIOVASCULAR NEGATIVE: 1
RHINORRHEA: 0
DIZZINESS: 0
ALLERGIC/IMMUNOLOGIC NEGATIVE: 1
NECK PAIN: 0
NAUSEA: 0
ARTHRALGIAS: 0
ENDOCRINE NEGATIVE: 1
HEADACHES: 0
MYALGIAS: 0
WOUND: 0
SHORTNESS OF BREATH: 0

## 2025-07-05 NOTE — NURSING NOTE
Clinic Administered Medication Documentation    Patient was given Dexamethasone 10 mg. Prior to medication administration, verified patient's identity using patient's name and date of birth.    Miranda Mendiola MA

## 2025-07-05 NOTE — PROGRESS NOTES
"Chief Complaint:     Chief Complaint   Patient presents with    swollen tonsil      X yesterday, pain when swallowing        Results for orders placed or performed in visit on 07/05/25   Streptococcus A Rapid Screen w/Reflex to PCR - Clinic Collect     Status: Abnormal    Specimen: Throat; Swab   Result Value Ref Range    Group A Strep antigen Positive (A) Negative       Medical Decision Making:    Vital signs reviewed by Renard Hunt PA-C  /76 (BP Location: Left arm, Patient Position: Sitting, Cuff Size: Adult Regular)   Pulse 81   Temp 98.2  F (36.8  C) (Oral)   Resp 18   Ht 1.651 m (5' 5\")   Wt 68.1 kg (150 lb 3.2 oz)   LMP  (LMP Unknown)   SpO2 100%   BMI 24.99 kg/m      Differential Diagnosis:  URI Adult/Peds:  Sinusitis, Strep pharyngitis, Tonsilitis, Viral pharyngitis, Viral syndrome, and Viral upper respiratory illness        ASSESSMENT    1. Strep throat    2. Sore throat        PLAN    Patient is in no acute distress.    Temp is 98.2 in clinic today, lung sounds were clear, and O2 sats at 100% on RA.    RST was positive.  Rx for Penicillin sent in.    Patient given 10 Mg Decadron PO in clinic today.    Rest, Push fluids, vaporizer, elevation of head of bed.  Ibuprofen and or Tylenol for any fever or body aches.  If symptoms worsen, recheck immediately otherwise follow up with your PCP in 1 week if symptoms are not improving.  Worrisome symptoms discussed with instructions to go to the ED.  Patient verbalized understanding and agreed with this plan.    Labs:    Results for orders placed or performed in visit on 07/05/25   Streptococcus A Rapid Screen w/Reflex to PCR - Clinic Collect     Status: Abnormal    Specimen: Throat; Swab   Result Value Ref Range    Group A Strep antigen Positive (A) Negative        Vital signs reviewed by Renard Hunt PA-C  /76 (BP Location: Left arm, Patient Position: Sitting, Cuff Size: Adult Regular)   Pulse 81   Temp 98.2  F (36.8  C) (Oral)   Resp " "18   Ht 1.651 m (5' 5\")   Wt 68.1 kg (150 lb 3.2 oz)   LMP  (LMP Unknown)   SpO2 100%   BMI 24.99 kg/m      Current Meds      Current Outpatient Medications:     acyclovir (ZOVIRAX) 400 MG tablet, Take 1 tablet (400 mg) by mouth 3 times daily for 10 days (Patient not taking: Reported on 5/12/2025), Disp: 30 tablet, Rfl: 0    benztropine (COGENTIN) 2 MG tablet, Take 1 tablet (2 mg) by mouth 2 times daily as needed (dystonia (if benadryl is not working)) (Patient not taking: Reported on 5/12/2025), Disp: 8 tablet, Rfl: 0    diphenhydrAMINE (BENADRYL) 25 MG tablet, Take 1-2 tablets (25-50 mg) by mouth every 6 hours as needed (dystonia, muscle spasms) (Patient not taking: Reported on 5/12/2025), Disp: 20 tablet, Rfl: 0    Lidocaine 2 % GEL, Externally apply 1 Application topically 3 times daily as needed (for skin irritation/pain) (Patient not taking: Reported on 5/12/2025), Disp: 1 Tube, Rfl: 0  No current facility-administered medications for this visit.      Respiratory History    occasional episodes of bronchitis      SUBJECTIVE    HPI: Niki Cruz is an 30 year old female who presents with sore throat.  Symptoms began 1  days ago and has gradually worsening.  There is no shortness of breath, wheezing, and chest pain.  Patient is eating and drinking well.  No fever, nausea, vomiting, or diarrhea.    Patient denies any recent travel or exposure to known COVID positive tested individual.      ROS:     Review of Systems   Constitutional:  Negative for chills and fever.   HENT:  Positive for sore throat. Negative for congestion, ear pain and rhinorrhea.    Eyes: Negative.    Respiratory: Negative.  Negative for cough and shortness of breath.    Cardiovascular: Negative.  Negative for chest pain and palpitations.   Gastrointestinal:  Negative for diarrhea, nausea and vomiting.   Endocrine: Negative.    Genitourinary: Negative.    Musculoskeletal: Negative.  Negative for arthralgias, back pain, joint swelling, " "myalgias, neck pain and neck stiffness.   Skin: Negative.  Negative for rash and wound.   Allergic/Immunologic: Negative.  Negative for immunocompromised state.   Neurological:  Negative for dizziness, weakness, light-headedness and headaches.         Family History   No family history on file.     Problem history  Patient Active Problem List   Diagnosis    HSV-2 infection        Allergies  Allergies   Allergen Reactions    Ciprofloxacin         Social History  Social History     Socioeconomic History    Marital status: Single     Spouse name: Not on file    Number of children: Not on file    Years of education: Not on file    Highest education level: Not on file   Occupational History    Not on file   Tobacco Use    Smoking status: Never     Passive exposure: Never    Smokeless tobacco: Never   Vaping Use    Vaping status: Every Day    Substances: Flavoring    Devices: Disposable   Substance and Sexual Activity    Alcohol use: Not on file    Drug use: Not on file    Sexual activity: Not on file   Other Topics Concern    Not on file   Social History Narrative    Not on file     Social Drivers of Health     Financial Resource Strain: Not on file   Food Insecurity: Not on file   Transportation Needs: Not on file   Physical Activity: Not on file   Stress: Not on file   Social Connections: Not on file   Interpersonal Safety: Not on file   Housing Stability: Not on file        OBJECTIVE     Vital signs reviewed by Renard Hunt PA-C  /76 (BP Location: Left arm, Patient Position: Sitting, Cuff Size: Adult Regular)   Pulse 81   Temp 98.2  F (36.8  C) (Oral)   Resp 18   Ht 1.651 m (5' 5\")   Wt 68.1 kg (150 lb 3.2 oz)   LMP  (LMP Unknown)   SpO2 100%   BMI 24.99 kg/m       Physical Exam  Vitals and nursing note reviewed.   Constitutional:       General: She is not in acute distress.     Appearance: She is well-developed. She is not ill-appearing, toxic-appearing or diaphoretic.   HENT:      Head: " Normocephalic and atraumatic.      Right Ear: Hearing, tympanic membrane, ear canal and external ear normal. Tympanic membrane is not perforated, erythematous, retracted or bulging.      Left Ear: Hearing, tympanic membrane, ear canal and external ear normal. Tympanic membrane is not perforated, erythematous, retracted or bulging.      Nose: Congestion present. No mucosal edema or rhinorrhea.      Right Sinus: No maxillary sinus tenderness or frontal sinus tenderness.      Left Sinus: No maxillary sinus tenderness or frontal sinus tenderness.      Mouth/Throat:      Pharynx: Posterior oropharyngeal erythema present. No pharyngeal swelling, oropharyngeal exudate or uvula swelling.      Tonsils: Tonsillar exudate present. No tonsillar abscesses. 3+ on the right. 3+ on the left.   Eyes:      General:         Right eye: No discharge.         Left eye: No discharge.      Pupils: Pupils are equal, round, and reactive to light.   Cardiovascular:      Rate and Rhythm: Normal rate and regular rhythm.      Heart sounds: Normal heart sounds. No murmur heard.     No friction rub. No gallop.   Pulmonary:      Effort: Pulmonary effort is normal. No respiratory distress.      Breath sounds: Normal breath sounds. No decreased breath sounds, wheezing, rhonchi or rales.   Chest:      Chest wall: No tenderness.   Abdominal:      General: Bowel sounds are normal. There is no distension.      Palpations: Abdomen is soft. There is no mass.      Tenderness: There is no abdominal tenderness. There is no guarding.   Musculoskeletal:      Cervical back: Normal range of motion and neck supple.   Lymphadenopathy:      Head:      Right side of head: No submental, submandibular, tonsillar, preauricular or posterior auricular adenopathy.      Left side of head: No submental, submandibular, tonsillar, preauricular or posterior auricular adenopathy.      Cervical: No cervical adenopathy.      Right cervical: No superficial or posterior cervical  adenopathy.     Left cervical: No superficial or posterior cervical adenopathy.   Skin:     General: Skin is warm and dry.      Findings: No rash.   Neurological:      Mental Status: She is alert and oriented to person, place, and time.      Cranial Nerves: No cranial nerve deficit.      Deep Tendon Reflexes: Reflexes are normal and symmetric.   Psychiatric:         Behavior: Behavior normal. Behavior is cooperative.         Thought Content: Thought content normal.         Judgment: Judgment normal.           Renard Hunt PA-C  7/5/2025, 2:53 PM

## 2025-07-05 NOTE — PROGRESS NOTES
Urgent Care Clinic Visit    Chief Complaint   Patient presents with    swollen tonsil      X yesterday, pain when swallowing                7/5/2025     2:52 PM   Additional Questions   Roomed by ne   Accompanied by self         7/5/2025     2:52 PM   Patient Reported Additional Medications   Patient reports taking the following new medications no